# Patient Record
Sex: MALE | Race: WHITE
[De-identification: names, ages, dates, MRNs, and addresses within clinical notes are randomized per-mention and may not be internally consistent; named-entity substitution may affect disease eponyms.]

---

## 2020-03-05 ENCOUNTER — HOSPITAL ENCOUNTER (OUTPATIENT)
Dept: HOSPITAL 63 - DXRAD | Age: 40
Discharge: HOME | End: 2020-03-05
Attending: NURSE PRACTITIONER
Payer: COMMERCIAL

## 2020-03-05 DIAGNOSIS — M25.511: Primary | ICD-10-CM

## 2020-03-05 PROCEDURE — 73030 X-RAY EXAM OF SHOULDER: CPT

## 2020-03-05 NOTE — RAD
3 views the right shoulder without comparison for pain for one year, no 

known injury.

 

FINDINGS: There is no fracture, dislocation, or acute osseous abnormality 

identified. Joints and soft tissues are grossly unremarkable. No 

pathologic calcifications are seen.

 

IMPRESSION:

1. No radiographically evident osseous abnormality.

 

Electronically signed by: Mehul Cortez MD (3/5/2020 2:47 PM) [id number]